# Patient Record
(demographics unavailable — no encounter records)

---

## 2024-10-24 NOTE — PHYSICAL EXAM

## 2024-10-24 NOTE — REASON FOR VISIT
[FreeTextEntry1] : Patient presents for follow up. He was placed on valsartan 80 mg daily. He is here to also review his lab test results.

## 2024-10-24 NOTE — DISCUSSION/SUMMARY
[FreeTextEntry1] : Patient was instructed to target his T. Cholesterol to less than 200 mg/dl and LDL cholesterol to less than 100 mg/dl. EKG: NSR , rate of 83 bpm, inc RBBB Exercise and weight loss was advised. Patient opted not to start lipid lowering medications and wishes to lower his lipids through diet and exercise. Patient was advised to repeat a BMP, CBC , fasting lipid profile and hepatic panel. He is on valsartan 80 mg QD and asked if he should continue the medication.. He is caring for his spouse who had suffered a stroke and has brain aneurysms. RV in 6 months  [EKG obtained to assist in diagnosis and management of assessed problem(s)] : EKG obtained to assist in diagnosis and management of assessed problem(s)